# Patient Record
Sex: MALE | Race: WHITE | NOT HISPANIC OR LATINO | URBAN - METROPOLITAN AREA
[De-identification: names, ages, dates, MRNs, and addresses within clinical notes are randomized per-mention and may not be internally consistent; named-entity substitution may affect disease eponyms.]

---

## 2024-11-15 ENCOUNTER — OFFICE VISIT (OUTPATIENT)
Dept: URGENT CARE | Age: 49
End: 2024-11-15
Payer: COMMERCIAL

## 2024-11-15 VITALS
SYSTOLIC BLOOD PRESSURE: 147 MMHG | OXYGEN SATURATION: 96 % | HEART RATE: 104 BPM | RESPIRATION RATE: 16 BRPM | DIASTOLIC BLOOD PRESSURE: 92 MMHG | BODY MASS INDEX: 30.53 KG/M2 | HEIGHT: 66 IN | TEMPERATURE: 98.1 F | WEIGHT: 190 LBS

## 2024-11-15 DIAGNOSIS — H60.392 OTHER INFECTIVE ACUTE OTITIS EXTERNA OF LEFT EAR: ICD-10-CM

## 2024-11-15 DIAGNOSIS — H61.22 IMPACTED CERUMEN OF LEFT EAR: Primary | ICD-10-CM

## 2024-11-15 DIAGNOSIS — H91.92 DECREASED HEARING OF LEFT EAR: ICD-10-CM

## 2024-11-15 RX ORDER — NEOMYCIN SULFATE, POLYMYXIN B SULFATE, HYDROCORTISONE 3.5; 10000; 1 MG/ML; [USP'U]/ML; MG/ML
3 SOLUTION/ DROPS AURICULAR (OTIC) 4 TIMES DAILY
Qty: 6 ML | Refills: 0 | Status: SHIPPED | OUTPATIENT
Start: 2024-11-15 | End: 2024-11-25

## 2024-11-15 ASSESSMENT — PATIENT HEALTH QUESTIONNAIRE - PHQ9
SUM OF ALL RESPONSES TO PHQ9 QUESTIONS 1 AND 2: 0
2. FEELING DOWN, DEPRESSED OR HOPELESS: NOT AT ALL
1. LITTLE INTEREST OR PLEASURE IN DOING THINGS: NOT AT ALL

## 2024-11-15 ASSESSMENT — ENCOUNTER SYMPTOMS
RHINORRHEA: 0
FEVER: 0
SORE THROAT: 0
CHILLS: 0
SINUS PRESSURE: 0

## 2024-11-15 NOTE — PROGRESS NOTES
"Subjective   Patient ID: Chinedu Bear is a 49 y.o. male. They present today with a chief complaint of ears clogged (x1day).    History of Present Illness  Subjective  Chinedu Bear is a 49 y.o. male whom I am asked to see for evaluation of diminished hearing and otalgia in the left ear for the past 1 day. There is a prior history of cerumen impaction. The patient has been using ear drops to loosen wax immediately prior to this visit. The patient complains of ear pain.    Objective  Auditory canal(s) of the left ear are completely obstructed with cerumen. Post-irrigation, no cerumen present.     Cerumen was removed using gentle irrigation. Tympanic membranes are intact following the procedure. Auditory canals are inflamed.    Assessment/Plan  Cerumen Impaction with otitis externa.  Care instructions given.  Home treatment: Cortisporin.  Follow-up as needed.        History provided by:  Patient   used: No        Past Medical History  Allergies as of 11/15/2024    (No Known Allergies)       (Not in a hospital admission)       No past medical history on file.    No past surgical history on file.         Review of Systems  Review of Systems   Constitutional:  Negative for chills and fever.   HENT:  Positive for ear pain and hearing loss. Negative for congestion, ear discharge, postnasal drip, rhinorrhea, sinus pressure and sore throat.                                   Objective    Vitals:    11/15/24 0914   BP: (!) 147/92   Pulse: 104   Resp: 16   Temp: 36.7 °C (98.1 °F)   TempSrc: Temporal   SpO2: 96%   Weight: 86.2 kg (190 lb)   Height: 1.676 m (5' 6\")     No LMP for male patient.    Physical Exam  Vitals and nursing note reviewed.   Constitutional:       General: He is not in acute distress.     Appearance: Normal appearance. He is normal weight. He is not ill-appearing, toxic-appearing or diaphoretic.   HENT:      Right Ear: Tympanic membrane, ear canal and external ear normal. There is no " impacted cerumen. Tympanic membrane is not perforated, erythematous, retracted or bulging.      Left Ear: Tympanic membrane normal. Swelling and tenderness present. There is impacted cerumen. Tympanic membrane is not perforated, erythematous, retracted or bulging.      Nose: No congestion or rhinorrhea.   Cardiovascular:      Rate and Rhythm: Normal rate.      Pulses: Normal pulses.   Pulmonary:      Effort: Pulmonary effort is normal.   Skin:     General: Skin is warm and dry.      Coloration: Skin is not pale.      Findings: No erythema.   Neurological:      General: No focal deficit present.      Mental Status: He is alert and oriented to person, place, and time.      Sensory: Sensory deficit present.         Procedures    Point of Care Test & Imaging Results from this visit  No results found for this visit on 11/15/24.   No results found.    Diagnostic study results (if any) were reviewed by ELY Echevarria.    Assessment/Plan   Allergies, medications, history, and pertinent labs/EKGs/Imaging reviewed by ELY Echevarria.     Medical Decision Making    Urgent Care Course: Patient presents to the ED with diminished hearing and cerumen impaction left ear.  Patient is afebrile, hemodynamically stable.  Patient denies fever, n/v, cp, sob, ab pain, dysuria, and diarrhea.  Patient clinically with otitis externa and cerumen impaction.  Patient on exam with impacted cerumen erythema of external auditory canal.  Post-irrigation, endorses diminished hearing.   No signs of otitis media and TM is intact.  Patient educated about otitis externa and will be referred to ENT.   Patient given ear infection warning signs, prescription for Cortisporin, and will f/u with pcp.    Prior external records reviewed: Outpatient records  Reviewed pertinent findings from resulted labs: None   Imaging and EKG: Interpretation by radiology and independently interpreted by me. None   Independent Hx provided by:  Patient  Med Rx  considered but ultimately not given:  Steroids   Dx tests considered but ultimately not ordered:  Covid, influenza, strep  Social determinant that may affects healthcare:  none  Pt's case/impression summarized and discussed with:  Patient  Likely Dx given clinical picture:  Otitis externa, cerumen impaction  Although not an exhaustive list of Differential Diagnosis (though considered), patient's HPI, PE, and other findings are not suggestive of:  cholesteatoma, sinusitis, osteomyelitis, dental abscess, otitis media  Patient at time of discharge was clinically well-appearing and HDS for outpatient management. The patient and/or family was given the opportunity to ask questions prior to discharge, understood my verbal discussion of the plans for treatment, expected course, indications to return to ED, and the need for timely follow up as directed.    Condition: Stable    Orders and Diagnoses  There are no diagnoses linked to this encounter.    Medical Admin Record      Patient disposition: Home    Electronically signed by ELY Echevarria  10:13 AM

## 2024-12-28 ENCOUNTER — OFFICE VISIT (OUTPATIENT)
Dept: URGENT CARE | Age: 49
End: 2024-12-28
Payer: COMMERCIAL

## 2024-12-28 ENCOUNTER — ANCILLARY PROCEDURE (OUTPATIENT)
Dept: URGENT CARE | Age: 49
End: 2024-12-28
Payer: COMMERCIAL

## 2024-12-28 VITALS
BODY MASS INDEX: 29.82 KG/M2 | WEIGHT: 190 LBS | RESPIRATION RATE: 16 BRPM | TEMPERATURE: 100.4 F | SYSTOLIC BLOOD PRESSURE: 156 MMHG | DIASTOLIC BLOOD PRESSURE: 90 MMHG | OXYGEN SATURATION: 96 % | HEIGHT: 67 IN | HEART RATE: 125 BPM

## 2024-12-28 DIAGNOSIS — R50.9 FEVER, UNSPECIFIED FEVER CAUSE: ICD-10-CM

## 2024-12-28 DIAGNOSIS — R05.1 ACUTE COUGH: ICD-10-CM

## 2024-12-28 DIAGNOSIS — R50.9 FEVER, UNSPECIFIED FEVER CAUSE: Primary | ICD-10-CM

## 2024-12-28 DIAGNOSIS — R00.0 TACHYCARDIA: ICD-10-CM

## 2024-12-28 LAB
POC BINAX EXPIRATION: 0
POC BINAX NOW COVID SERIAL NUMBER: 0
POC RAPID INFLUENZA A: NEGATIVE
POC RAPID INFLUENZA B: NEGATIVE
POC SARS-COV-2 AG BINAX: NORMAL

## 2024-12-28 PROCEDURE — 71046 X-RAY EXAM CHEST 2 VIEWS: CPT | Performed by: NURSE PRACTITIONER

## 2024-12-28 RX ORDER — AMLODIPINE BESYLATE 10 MG/1
10 TABLET ORAL
COMMUNITY
Start: 2024-12-04 | End: 2025-12-04

## 2024-12-28 RX ORDER — CLOTRIMAZOLE AND BETAMETHASONE DIPROPIONATE 10; .64 MG/G; MG/G
1 CREAM TOPICAL 2 TIMES DAILY
COMMUNITY
Start: 2024-12-21

## 2024-12-28 RX ORDER — AZITHROMYCIN 250 MG/1
TABLET, FILM COATED ORAL
Qty: 6 TABLET | Refills: 0 | Status: SHIPPED | OUTPATIENT
Start: 2024-12-28 | End: 2025-01-02

## 2024-12-28 RX ORDER — OMEPRAZOLE 20 MG/1
40 CAPSULE, DELAYED RELEASE ORAL
COMMUNITY
Start: 2024-08-12

## 2024-12-28 RX ORDER — OLMESARTAN MEDOXOMIL 20 MG/1
20 TABLET ORAL DAILY
COMMUNITY

## 2024-12-28 ASSESSMENT — PATIENT HEALTH QUESTIONNAIRE - PHQ9
2. FEELING DOWN, DEPRESSED OR HOPELESS: NOT AT ALL
SUM OF ALL RESPONSES TO PHQ9 QUESTIONS 1 AND 2: 0
1. LITTLE INTEREST OR PLEASURE IN DOING THINGS: NOT AT ALL

## 2024-12-28 ASSESSMENT — ENCOUNTER SYMPTOMS
COUGH: 1
FEVER: 1

## 2024-12-28 NOTE — PROGRESS NOTES
"Subjective   Patient ID: Chinedu Bear is a 49 y.o. male. They present today with a chief complaint of Fever, Generalized Body Aches, Chills, and Cough (x1day).    History of Present Illness  Pt presents to the  with the c/o fever, cough and body aches x 1 day. (+) temp in UC this evening. Pt HR is elevated secondary to temperature and illness. COVID and Flu completed in the UC and negative. However advised patient his symptoms just started 24 hours ago and may not have accurate readings to rapid tests and encourage repeat testing with home tests if concerned for Flu and COVID in the next 24 to 48 hours. Pt state he has been taking Tylenol and Motrin for fever and body aches. Took Mucinex for cough. Denies sob, cp or pain with deep inspiration. No other associated symptoms or concerns to address at this time.       Fever   Associated symptoms include coughing.   Cough  Associated symptoms include a fever.       Past Medical History  Allergies as of 12/28/2024    (No Known Allergies)       (Not in a hospital admission)       History reviewed. No pertinent past medical history.    History reviewed. No pertinent surgical history.         Review of Systems  Review of Systems   Constitutional:  Positive for fever.   Respiratory:  Positive for cough.      10 point ROS completed and all are negative other than what is stated in the current HPI                               Objective    Vitals:    12/28/24 1837 12/28/24 1907   BP: 156/90    Pulse: (!) 130 (!) 125   Resp: 16    Temp: (!) 38 °C (100.4 °F)    TempSrc: Oral    SpO2: 93% 96%   Weight: 86.2 kg (190 lb)    Height: 1.702 m (5' 7\")      No LMP for male patient.    Physical Exam  Vitals and nursing note reviewed.   Constitutional:       Appearance: Normal appearance.   HENT:      Head: Normocephalic and atraumatic.      Right Ear: Tympanic membrane, ear canal and external ear normal.      Left Ear: Tympanic membrane, ear canal and external ear normal.      Nose: " Congestion and rhinorrhea present.      Mouth/Throat:      Mouth: Mucous membranes are moist.      Comments: (+)postnasal discharge  Cardiovascular:      Rate and Rhythm: Regular rhythm. Tachycardia present.      Heart sounds: Normal heart sounds.   Pulmonary:      Effort: Pulmonary effort is normal.      Breath sounds: No wheezing or rhonchi.      Comments: Diminished posteriorly  Musculoskeletal:      Cervical back: No tenderness.   Lymphadenopathy:      Cervical: No cervical adenopathy.   Skin:     General: Skin is warm and dry.      Findings: No rash.   Neurological:      Mental Status: He is alert and oriented to person, place, and time.   Psychiatric:         Behavior: Behavior normal.         Procedures    Point of Care Test & Imaging Results from this visit  Results for orders placed or performed in visit on 12/28/24   POCT Covid-19 Rapid Antigen   Result Value Ref Range    Binax NOW Covid Serial Number 0     BINAX NOW Covid Expiration 0     POC BREANNE-COV-2 AG  Presumptive negative test for SARS-CoV-2 (no antigen detected)     Presumptive negative test for SARS-CoV-2 (no antigen detected)   POCT Influenza A/B manually resulted   Result Value Ref Range    POC Rapid Influenza A Negative Negative    POC Rapid Influenza B Negative Negative      XR chest 2 views    Result Date: 12/28/2024  Interpreted By:  Luis Miguel Schmitt, STUDY: XR CHEST 2 VIEWS;  12/28/2024 7:11 pm   INDICATION: Signs/Symptoms:fever.   COMPARISON: None.   ACCESSION NUMBER(S): [id number]   ORDERING CLINICIAN: NANDINI BULLARD   FINDINGS: The cardiac silhouette is normal in size. Mild left basilar atelectasis. No focal airspace consolidation or pleural effusion. No pneumothorax.       Mild left basilar subsegmental atelectasis.   MACRO: None   Signed by: Luis Miguel Schmitt 12/28/2024 7:46 PM Dictation workstation:   [id number]     Diagnostic study results (if any) were reviewed by Nandini Bullard, APRN-CNP.    Assessment/Plan   Allergies,  medications, history, and pertinent labs/EKGs/Imaging reviewed by MACKENZIE Addison-CNP.     Medical Decision Making  Fever:  - COVID and Flu completed and negative  -Tylenol/Motrin as needed to keep fever controlled  -Good oral hydration to prevent dehydration; discussed s/s of dehydration  -Advised on s/s to seek emergent care  - Advised can have fever when viral or bacterial  -f/u with PCP in the next 3-5 days if no better    Acute Cough:  - Chest x-ray completed; mild left basil subsegmental atelectasis; discussed follow up with PCP in the next 7 days or back in the  for repeat chest x-ray  - Good oral hydration; avoid milk products  - Jacinto's Vapor rub; humidifier; warm showers  - Take medications as prescribed; otc Coricidin with elevation in BP  - Advised on s/s to seek emergent care for  - f/u with PCP in the next 3-5 days if no better    Orders and Diagnoses  Diagnoses and all orders for this visit:  Fever, unspecified fever cause  -     XR chest 2 views; Future  -     POCT Covid-19 Rapid Antigen  -     POCT Influenza A/B manually resulted  -     azithromycin (Zithromax) 250 mg tablet; Take 2 tabs (500 mg) by mouth today, than 1 daily for 4 days.  Acute cough  -     azithromycin (Zithromax) 250 mg tablet; Take 2 tabs (500 mg) by mouth today, than 1 daily for 4 days.  Tachycardia      Medical Admin Record      Patient disposition: Home    Electronically signed by ELY Addison  8:41 PM

## 2024-12-28 NOTE — PATIENT INSTRUCTIONS
Fever:  - COVID and Flu completed and negative  -Tylenol/Motrin as needed to keep fever controlled  -Good oral hydration to prevent dehydration; discussed s/s of dehydration  -Advised on s/s to seek emergent care  - Advised can have fever when viral or bacterial  -f/u with PCP in the next 3-5 days if no better    Acute Cough:  - Chest x-ray completed; mild left basil subsegmental atelectasis; discussed follow up with PCP in the next 7 days or back in the  for repeat chest x-ray  - Good oral hydration; avoid milk products  - Jacinto's Vapor rub; humidifier; warm showers  - Take medications as prescribed; otc Coricidin with elevation in BP  - Advised on s/s to seek emergent care for  - f/u with PCP in the next 3-5 days if no better